# Patient Record
Sex: FEMALE | Race: WHITE | NOT HISPANIC OR LATINO | Employment: UNEMPLOYED | URBAN - METROPOLITAN AREA
[De-identification: names, ages, dates, MRNs, and addresses within clinical notes are randomized per-mention and may not be internally consistent; named-entity substitution may affect disease eponyms.]

---

## 2024-02-17 ENCOUNTER — HOSPITAL ENCOUNTER (EMERGENCY)
Facility: CLINIC | Age: 59
Discharge: HOME | End: 2024-02-17
Attending: EMERGENCY MEDICINE | Admitting: EMERGENCY MEDICINE

## 2024-02-17 ENCOUNTER — APPOINTMENT (OUTPATIENT)
Dept: GENERAL RADIOLOGY | Facility: CLINIC | Age: 59
End: 2024-02-17
Attending: EMERGENCY MEDICINE

## 2024-02-17 ENCOUNTER — APPOINTMENT (OUTPATIENT)
Dept: GENERAL RADIOLOGY | Facility: CLINIC | Age: 59
End: 2024-02-17
Attending: SOCIAL WORKER

## 2024-02-17 VITALS
RESPIRATION RATE: 16 BRPM | TEMPERATURE: 98.4 F | WEIGHT: 150 LBS | HEIGHT: 65 IN | HEART RATE: 78 BPM | BODY MASS INDEX: 24.99 KG/M2 | DIASTOLIC BLOOD PRESSURE: 104 MMHG | OXYGEN SATURATION: 95 % | SYSTOLIC BLOOD PRESSURE: 143 MMHG
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
## 2024-02-17 DIAGNOSIS — S52.602A CLOSED FRACTURE OF DISTAL ENDS OF LEFT RADIUS AND ULNA, INITIAL ENCOUNTER: ICD-10-CM

## 2024-02-17 DIAGNOSIS — S52.502A CLOSED FRACTURE OF DISTAL ENDS OF LEFT RADIUS AND ULNA, INITIAL ENCOUNTER: ICD-10-CM

## 2024-02-17 LAB
ANION GAP SERPL CALCULATED.3IONS-SCNC: 9 MMOL/L (ref 7–15)
ATRIAL RATE - MUSE: 73 BPM
BUN SERPL-MCNC: 10.6 MG/DL (ref 6–20)
CALCIUM SERPL-MCNC: 9.1 MG/DL (ref 8.6–10)
CHLORIDE SERPL-SCNC: 102 MMOL/L (ref 98–107)
CREAT SERPL-MCNC: 0.56 MG/DL (ref 0.51–0.95)
DEPRECATED HCO3 PLAS-SCNC: 26 MMOL/L (ref 22–29)
DIASTOLIC BLOOD PRESSURE - MUSE: NORMAL MMHG
EGFRCR SERPLBLD CKD-EPI 2021: >90 ML/MIN/1.73M2
ERYTHROCYTE [DISTWIDTH] IN BLOOD BY AUTOMATED COUNT: 13 % (ref 10–15)
GLUCOSE SERPL-MCNC: 102 MG/DL (ref 70–99)
HCT VFR BLD AUTO: 38.3 % (ref 35–47)
HGB BLD-MCNC: 12.6 G/DL (ref 11.7–15.7)
INTERPRETATION ECG - MUSE: NORMAL
MCH RBC QN AUTO: 29 PG (ref 26.5–33)
MCHC RBC AUTO-ENTMCNC: 32.9 G/DL (ref 31.5–36.5)
MCV RBC AUTO: 88 FL (ref 78–100)
P AXIS - MUSE: 62 DEGREES
PLATELET # BLD AUTO: 331 10E3/UL (ref 150–450)
POTASSIUM SERPL-SCNC: 3.6 MMOL/L (ref 3.4–5.3)
PR INTERVAL - MUSE: 162 MS
QRS DURATION - MUSE: 84 MS
QT - MUSE: 396 MS
QTC - MUSE: 436 MS
R AXIS - MUSE: -4 DEGREES
RBC # BLD AUTO: 4.34 10E6/UL (ref 3.8–5.2)
SODIUM SERPL-SCNC: 137 MMOL/L (ref 135–145)
SYSTOLIC BLOOD PRESSURE - MUSE: NORMAL MMHG
T AXIS - MUSE: 67 DEGREES
VENTRICULAR RATE- MUSE: 73 BPM
WBC # BLD AUTO: 15.6 10E3/UL (ref 4–11)

## 2024-02-17 PROCEDURE — 99285 EMERGENCY DEPT VISIT HI MDM: CPT | Mod: 25

## 2024-02-17 PROCEDURE — 96372 THER/PROPH/DIAG INJ SC/IM: CPT | Performed by: EMERGENCY MEDICINE

## 2024-02-17 PROCEDURE — 80048 BASIC METABOLIC PNL TOTAL CA: CPT | Performed by: STUDENT IN AN ORGANIZED HEALTH CARE EDUCATION/TRAINING PROGRAM

## 2024-02-17 PROCEDURE — 73110 X-RAY EXAM OF WRIST: CPT | Mod: LT

## 2024-02-17 PROCEDURE — 99222 1ST HOSP IP/OBS MODERATE 55: CPT | Performed by: STUDENT IN AN ORGANIZED HEALTH CARE EDUCATION/TRAINING PROGRAM

## 2024-02-17 PROCEDURE — 25605 CLTX DST RDL FX/EPHYS SEP W/: CPT | Mod: LT

## 2024-02-17 PROCEDURE — 250N000011 HC RX IP 250 OP 636: Performed by: EMERGENCY MEDICINE

## 2024-02-17 PROCEDURE — 85014 HEMATOCRIT: CPT | Performed by: STUDENT IN AN ORGANIZED HEALTH CARE EDUCATION/TRAINING PROGRAM

## 2024-02-17 PROCEDURE — 999N000065 XR WRIST LEFT 2 VIEWS: Mod: LT

## 2024-02-17 PROCEDURE — 36415 COLL VENOUS BLD VENIPUNCTURE: CPT | Performed by: STUDENT IN AN ORGANIZED HEALTH CARE EDUCATION/TRAINING PROGRAM

## 2024-02-17 RX ORDER — MORPHINE SULFATE 15 MG/1
15-30 TABLET ORAL EVERY 6 HOURS PRN
Qty: 12 TABLET | Refills: 0 | Status: ON HOLD | OUTPATIENT
Start: 2024-02-17 | End: 2024-02-19

## 2024-02-17 RX ORDER — HYDROMORPHONE HYDROCHLORIDE 1 MG/ML
0.5 INJECTION, SOLUTION INTRAMUSCULAR; INTRAVENOUS; SUBCUTANEOUS ONCE
Status: COMPLETED | OUTPATIENT
Start: 2024-02-17 | End: 2024-02-17

## 2024-02-17 RX ADMIN — HYDROMORPHONE HYDROCHLORIDE 0.5 MG: 1 INJECTION, SOLUTION INTRAMUSCULAR; INTRAVENOUS; SUBCUTANEOUS at 12:47

## 2024-02-17 ASSESSMENT — ACTIVITIES OF DAILY LIVING (ADL)
ADLS_ACUITY_SCORE: 35
ADLS_ACUITY_SCORE: 35

## 2024-02-17 NOTE — ED TRIAGE NOTES
Left wrist pain after slip and fall on ice on the driveway, daughter denies blood thinners, daughter denies hitting head.      Triage Assessment (Adult)       Row Name 02/17/24 1107          Triage Assessment    Airway WDL WDL

## 2024-02-17 NOTE — CONSULTS
[facility]  Consult Note - Hospitalist Service  Date of Admission:  2/17/2024  Consult Requested by: Dr. Romano  Reason for Consult: Pre-operative clearance     Assessment & Plan   Kaitlyn Mahmood is a 58 year old female presented to the ED on 2/17/2024 after slipping and falling in the driveway. She sustained L distal radius fracture and ulnar styloid process fracture.     Distal radius fracture  Ulnar styloid process fracture   Pt slipped and fell in the driveway. She sustained L distal radius fracture and ulnar styloid process fracture. This was splinted in the ED. Orthopedics was contacted and recommend surgical fixation. The patient will be able to discharge home with plans for operative intervention next week. [place] and does not have a PCP here. The hospitalist service was contacted to provide pre-operative clearance.   * Pt denies any significant past medical history, no cardiac hx of known diabetes. She does not take any prescribed medications. No prior surgical history. No hx of easy bleeding or bruising.  * CBC and BMP obtained in the ED. CBC is notable for a mild leukocytosis to 15.6. There is however no signs or symptoms of infectious process. I suspect this is a stress response.   * EKG showed normal sinus rhythm   - I have examined this patient, checked all appropriate lab work and tests. The patient appears medically optimized to undergo the proposed surgical procedure with general and or regional anesthesia. No additional medical work up is required at this time.     Elevated Blood pressures  The patient's blood pressure is elevated to 140-160s systolic. She does not report being on any anti-hypertensive medications. Elevated BPs may be 2/2 pain in the setting of acute wrist fracture.  - I would not recommend starting any anti-hypertensive at this time.   - I would recommend outpatient follow-up and consideration of  starting an anti-hypertensive if blood pressures remain elevated in the outpatient setting.        Clinically Significant Risk Factors Present on Admission                                  Any Santos MD  Hospitalist Service  Securely message with [employer] (more info)  Text page via [facility] Paging/Directory   ______________________________________________________________________    Chief Complaint   Slip and fall     History is obtained from the patient. Her daughter assists with interpretation     History of Present Illness   Kaitlyn Mahmood is a 58 year old female who presented to the ED after slipping and falling in the driveway.    Pt denies any medical history. She has no prior cardiac disease. She has at times had some elevated blood pressures, but she is not on any medications for this. She does not have diabetes. No chest pain, shortness of breath or lower extremity edema. No hx of easy bruising or bleeding. She has no surgical history. No prior hx of anesthesia. No notable family history. She denies tobacco or alcohol use.        Past Medical History    No past medical history on file.    Past Surgical History   No past surgical history on file.    Medications   I have reviewed this patient's current medications  - NONE       Review of Systems    The 10 point Review of Systems is negative other than noted in the HPI or here.     Physical Exam   Vital Signs: Temp: 98.4  F (36.9  C) Temp src: Temporal BP: (!) 143/104 Pulse: 78   Resp: 16 SpO2: 95 % O2 Device: None (Room air)    Weight: 150 lbs 0 oz    Constitutional: Awake, alert, cooperative, no apparent distress.  Eyes: Conjunctiva and pupils examined and normal.  HEENT: Moist mucous membranes, normal dentition.  Respiratory: Clear to auscultation bilaterally, no crackles or wheezing.  Cardiovascular: Regular rate and rhythm, normal S1 and S2, and no murmur noted.  GI: Soft, non-distended, non-tender, normal bowel sounds.  Skin: No rashes, no  cyanosis, no edema.  Musculoskeletal: Left arm splinted and in sling   Neurologic: Cranial nerves 2-12 intact, normal strength and sensation.  Psychiatric: Alert, oriented to person, place and time, no obvious anxiety or depression.      Medical Decision Making       65 MINUTES SPENT BY ME on the date of service doing chart review, history, exam, documentation & further activities per the note.      Data     I have personally reviewed the following data over the past 24 hrs:    15.6 (H)  \   12.6   / 331     N/A N/A N/A /  N/A   N/A N/A N/A \       Imaging results reviewed over the past 24 hrs:   Recent Results (from the past 24 hour(s))   XR Wrist Left G/E 3 Views    Narrative    EXAM: XR WRIST LEFT G/E 3 VIEWS  LOCATION: [facility]  DATE: 2/17/2024    INDICATION: Pain.  COMPARISON: None.      Impression    IMPRESSION:    There is an acute comminuted mildly displaced and angulated fracture of the distal radius. Acute displaced ulnar styloid process fracture. Positive ulnar variance, likely posttraumatic.      NOTE: ABNORMAL REPORT    THE DICTATION ABOVE DESCRIBES AN ABNORMALITY FOR WHICH FOLLOW-UP IS NEEDED.     XR Wrist Left 2 Views    Narrative    EXAM: XR WRIST LEFT 2 VIEWS  LOCATION: [facility]  DATE: 02/17/2024    INDICATION: Post reduction.  COMPARISON: Same day radiograph.      Impression    IMPRESSION: Interval splinting of the impacted and displaced distal radius and ulnar fractures. Partial improvement in alignment. Fine bony detail is obscured. Bone demineralization.

## 2024-02-17 NOTE — CONSULTS
[facility]    Orthopedics Consultation    Date of Admission:  2/17/2024    Assessment & Plan     PLAN:     Kaitlyn Mahmood  is a 58 year old female with a displaced distal radius fracture on the left after a fall.  She is neurovascular intact on exam.  She is status post a reduction in the emergency department.  Radiographs reveal comminution displaced distal radius fracture.  I discussed these findings with the patient as well as her daughter.  The patient is R[place]an-speaking and uses her daughter for interpretation.  We discussed potential treatment options including intervention along with risk and benefits expected recovery.  I recommended surgery improve the alignment of the bone and allow for earlier function and motion.  After a thorough discussion in agreement elected proceed to surgery.    Plan for a left distal radius open reduction internal fixation.  Patient may be discharged and will follow-up for surgery, likely this upcoming Monday  Recommend medical evaluation while in the hospital for preoperative clearance and to expedite the patient's ability to proceed with surgery.  Splint and sling    Active Problems:    * No active hospital problems. *    ISMAEL HOGUE MD     Code Status    No Order    Reason for Consult   Reason for consult: Wrist fracture    Primary Care Physician   Physician No Ref-Primary    Chief Complaint   Wrist fracture    History is obtained from the patient and daughter    History of Present Illness   Kaitlyn Mahmood is a 58 year old female who presents with a left distal radius fracture after a fall.  Patient reports she slipped and fell on ice in the driveway.  She had obvious pain and deformity.  She presented to [facility] emergency department where radiographs were obtained and revealed a displaced distal radius fracture.  She is status post closed reduction and splinting in the emergency department.  She denies any numbness  or paresthesias.  No prior injuries.  She is Palauan speaking and her daughter was available for interpretation.    Past Medical History   I have reviewed this patient's medical history and updated it with pertinent information if needed.   No past medical history on file.    Past Surgical History   I have reviewed this patient's surgical history and updated it with pertinent information if needed.  No past surgical history on file.    Prior to Admission Medications   None     Allergies   No Known Allergies    Social History   I have reviewed this patient's social history and updated it with pertinent information if needed. Kaitlyn Mahmood      Family History   I have reviewed this patient's family history and updated it with pertinent information if needed.   No family history on file.    Review of Systems   The 10 point Review of Systems is negative other than noted in the HPI or here.     Physical Exam   Temp: 98.4  F (36.9  C) Temp src: Temporal BP: (!) 143/104 Pulse: 78   Resp: 16 SpO2: 95 % O2 Device: None (Room air)    Vital Signs with Ranges  Temp:  [98.4  F (36.9  C)] 98.4  F (36.9  C)  Pulse:  [78-82] 78  Resp:  [16] 16  BP: (143-168)/(101-104) 143/104  SpO2:  [95 %-96 %] 95 %  150 lbs 0 oz    Constitutional: awake, alert, cooperative, no apparent distress, and appears stated age  Eyes: extra-ocular muscles intact, no scleral icterus  ENT: normocepalic, atraumatic without obvious abnormality  Respiratory: no increased work of breathing, symmetric chest wall expansion    MSK:  Left Upper Extremity  Splint and sling intact  Unable to assess motion due to pain  Tenderness to palpation over distal radius  No tenderness to palpation over the elbow  Sensations intact to light touch in the median, ulnar, radial nerve distributions  FPL, EPL, Intrinsic hand muscles are intact  2+ radial pulse    Right Upper Extremity  Skin is intact. No swelling or ecchymosis  No tenderness to palpation over the shoulder,  arm, elbow, forearm, wrist or hand.  No pain with ROM of the shoulder, elbow, wrist, hand  Sensations intact to light touch in the median, ulnar, radial nerve distributions  FPL, EPL, Intrinsic hand muscles are intact  2+ radial pulse      Data   Most Recent 3 CBC's:  Recent Labs   Lab Test 02/17/24  1516   WBC 15.6*   HGB 12.6   MCV 88        Most Recent 3 BMP's:No lab results found.  Most Recent 3 INR's:No lab results found.

## 2024-02-17 NOTE — DISCHARGE INSTRUCTIONS
Ice area pain 20 minutes, 4 times per day for the next several days  Keep splint dry as discussed  Tylenol and/or ibuprofen as needed for pain control  For breakthrough pain can take immediate release morphine tablets as prescribed; recommend using over-the-counter stool softener as this medication may cause constipation    Do not eat after midnight on Sunday night in anticipation of potential surgery on Monday

## 2024-02-17 NOTE — ED PROVIDER NOTES
"  History     Chief Complaint:  Wrist Pain     A  was used (Lebanese).      Kaitlyn Mahmood is a 58 year old female who presents to the ED with daughter for left wrist pain. Per daughter, patient fell (slipped on ice) and caught herself with left wrist about an hour ago.  Patient had immediate pain and deformity to the wrist after fall.  No other injuries reported.  Patient is still able to move her fingers; denies numbness.  Patient is visiting from [facility]; daughter is serving as .  Patient is in the [place] on travel visa.    Independent Historian:   Daughter - They report supplementary information.     Review of External Notes:   N/a     Medications:    No current outpatient medications reported.     Past Medical History:    No past medical history reported.     Past Surgical History:    No past surgical history reported.      Physical Exam   Patient Vitals for the past 24 hrs:   BP Temp Temp src Pulse Resp SpO2 Height Weight   02/17/24 1324 (!) 143/104 -- -- 78 -- 95 % -- --   02/17/24 1110 (!) 168/101 98.4  F (36.9  C) Temporal 82 16 96 % 1.651 m (5' 5\") 68 kg (150 lb)        Physical Exam  General: Patient in mild distress.  Alert and cooperative with exam. Normal mentation  HEENT: NC/AT. Conjunctiva without injection or scleral icterus. External ears normal.  Respiratory: Breathing comfortably on room air  CV: Normal rate, all extremities well perfused  GI:  Non-distended abdomen  Skin: Warm, dry, no rashes/open wounds on exposed skin  Musculoskeletal: Left upper extremity: CMS intact.  Obvious deformity, tenderness, and swelling at the wrist.  Elbow and hand exam normal.  No evidence of open fracture.  Compartments soft and compressible.  Neuro: Alert, answers questions appropriately. No gross motor deficits      Emergency Department Course     ECG results from 02/17/24   EKG 12-lead, tracing only     Value    Systolic Blood Pressure     Diastolic Blood " Pressure     Ventricular Rate 73    Atrial Rate 73    IL Interval 162    QRS Duration 84        QTc 436    P Axis 62    R AXIS -4    T Axis 67    Interpretation ECG      Sinus rhythm  Possible Anterior infarct , age undetermined  Abnormal ECG  No previous ECGs available  Confirmed by GENERATED REPORT, COMPUTER ([id number]),  Sushila Rivas ([id number]) on 2/17/2024 3:35:46 PM         Imaging:  XR Wrist Left 2 Views   Final Result   IMPRESSION: Interval splinting of the impacted and displaced distal radius and ulnar fractures. Partial improvement in alignment. Fine bony detail is obscured. Bone demineralization.         XR Wrist Left G/E 3 Views   Final Result   IMPRESSION:      There is an acute comminuted mildly displaced and angulated fracture of the distal radius. Acute displaced ulnar styloid process fracture. Positive ulnar variance, likely posttraumatic.         NOTE: ABNORMAL REPORT      THE DICTATION ABOVE DESCRIBES AN ABNORMALITY FOR WHICH FOLLOW-UP IS NEEDED.               Laboratory:  Labs Ordered and Resulted from Time of ED Arrival to Time of ED Departure   CBC WITH PLATELETS - Abnormal       Result Value    WBC Count 15.6 (*)     RBC Count 4.34      Hemoglobin 12.6      Hematocrit 38.3      MCV 88      MCH 29.0      MCHC 32.9      RDW 13.0      Platelet Count 331     BASIC METABOLIC PANEL - Abnormal    Sodium 137      Potassium 3.6      Chloride 102      Carbon Dioxide (CO2) 26      Anion Gap 9      Urea Nitrogen 10.6      Creatinine 0.56      GFR Estimate >90      Calcium 9.1      Glucose 102 (*)         Procedures     Splint Placement     Procedure: Splint Placement     Indication: Fracture    Consent: Verbal     Location: Left Wrist    Procedure detail:   Splint was applied by Myself  Splint type: Sugar-tong   Splint materilal: Plaster  After placement I checked and adjusted the fit as needed to ensure proper positioning/fit   Sensation and circulation are intact after splint placement     Patient  Status: The patient tolerated the procedure well: Yes. There were no complications.  Patient notes improvement in pain with splint in place    Fracture Reduction     Procedure: Fracture Reduction     Indication: Left distal radius/ulna fracture    Location: Left wrist    Consent: Verbal from Patient     Anesthesia/Sedation: Lidocaine - 1% -hematoma block    Procedure Detail: I manually manipulated and reduced the fracture.   Immobilized with: Custom splint (See separate procedure note)  Post-reduction x-ray showed improved alignment  Post-procedure distal neurovascular function was intact.     Patient Status:  The patient tolerated the procedure well: Yes. There were no complications.   Emergency Department Course & Assessments:       Interventions:  Medications   HYDROmorphone (PF) (DILAUDID) injection 0.5 mg (0.5 mg Intramuscular $Given 2/17/24 1247)        Assessments:  1206 The medical student obtained history and examined the patient as noted above.   1230 I obtained history and examined the patient as noted above.     Independent Interpretation (X-rays, CTs, rhythm strip):  Left wrist x-ray: Comminuted distal radius fracture with ulnar styloid fracture  Left wrist x-ray postreduction: Partial improvement in alignment    Consultations/Discussion of Management or Tests:  Patient's care was discussed with Dr. Santos of the hospitalist service.  Patient's care was discussed with Dr. Bravo of orthopedics       Social Determinants of Health affecting care:   Healthcare Access/Compliance    Disposition:  The patient was discharged.     Impression & Plan      Medical Decision Making:  Patient is a 58-year-old female who presents with left wrist pain after slipping and falling in the ice.  Patient's medical history and records reviewed.  Initial consideration for, not limited to, fracture, dislocation, soft tissue injury, among others.  X-ray demonstrates distal radius and ulnar styloid fracture as noted above.   Patient consented to and underwent attempt at fracture reduction and splint placement per procedure note above.  Fracture was able to be partially reduced.  Discussed with orthopedics who will plan for outpatient surgical intervention.  Hospitalist service was contacted for preoperative clearance; preoperative labs and EKG without significant acute findings.  Patient's pain was well-controlled in the ED.  Discussed supportive care and provided prescription for MS IR for breakthrough pain.  [facility] orthopedics is planning for surgery on Monday (in 2 days) ; patient understands that she is to be n.p.o. Sunday night.  Return precautions discussed.  Discharged home with family.    Diagnosis:    ICD-10-CM    1. Closed fracture of distal ends of left radius and ulna, initial encounter  S52.502A     S52.602A       2. Fall due to slipping on ice or snow, initial encounter  W00.9XXA            Discharge Medications:  Discharge Medication List as of 2/17/2024  4:29 PM        START taking these medications    Details   morphine (MSIR) 15 MG IR tablet Take 1-2 tablets (15-30 mg) by mouth every 6 hours as needed for severe pain, Disp-12 tablet, R-0, Local Print              Scribe Disclosure:  ILoreto, am serving as a scribe at 12:37 PM on 2/17/2024 to document services personally performed by Fransisco Romano DO  based on my observations and the provider's statements to me.   2/17/2024   Fransisco Romano Christopher Warren, DO  02/17/24 1917

## 2024-02-18 ENCOUNTER — PREP FOR PROCEDURE (OUTPATIENT)
Dept: OTHER | Facility: CLINIC | Age: 59
End: 2024-02-18

## 2024-02-18 DIAGNOSIS — S52.502A CLOSED FRACTURE OF DISTAL END OF LEFT RADIUS, UNSPECIFIED FRACTURE MORPHOLOGY, INITIAL ENCOUNTER: Primary | ICD-10-CM

## 2024-02-19 ENCOUNTER — HOSPITAL ENCOUNTER (OUTPATIENT)
Facility: CLINIC | Age: 59
Discharge: HOME | End: 2024-02-19
Attending: STUDENT IN AN ORGANIZED HEALTH CARE EDUCATION/TRAINING PROGRAM | Admitting: STUDENT IN AN ORGANIZED HEALTH CARE EDUCATION/TRAINING PROGRAM

## 2024-02-19 ENCOUNTER — ANESTHESIA (OUTPATIENT)
Dept: SURGERY | Facility: CLINIC | Age: 59
End: 2024-02-19

## 2024-02-19 ENCOUNTER — ANESTHESIA EVENT (OUTPATIENT)
Dept: SURGERY | Facility: CLINIC | Age: 59
End: 2024-02-19

## 2024-02-19 ENCOUNTER — APPOINTMENT (OUTPATIENT)
Dept: GENERAL RADIOLOGY | Facility: CLINIC | Age: 59
End: 2024-02-19
Attending: STUDENT IN AN ORGANIZED HEALTH CARE EDUCATION/TRAINING PROGRAM

## 2024-02-19 VITALS
HEART RATE: 64 BPM | DIASTOLIC BLOOD PRESSURE: 71 MMHG | TEMPERATURE: 97.5 F | SYSTOLIC BLOOD PRESSURE: 118 MMHG | BODY MASS INDEX: 27.32 KG/M2 | WEIGHT: 164.2 LBS | OXYGEN SATURATION: 95 % | RESPIRATION RATE: 12 BRPM

## 2024-02-19 DIAGNOSIS — S52.502A CLOSED FRACTURE OF DISTAL END OF LEFT RADIUS, UNSPECIFIED FRACTURE MORPHOLOGY, INITIAL ENCOUNTER: Primary | ICD-10-CM

## 2024-02-19 PROCEDURE — 250N000025 HC SEVOFLURANE, PER MIN: Performed by: STUDENT IN AN ORGANIZED HEALTH CARE EDUCATION/TRAINING PROGRAM

## 2024-02-19 PROCEDURE — C1713 ANCHOR/SCREW BN/BN,TIS/BN: HCPCS | Performed by: STUDENT IN AN ORGANIZED HEALTH CARE EDUCATION/TRAINING PROGRAM

## 2024-02-19 PROCEDURE — 250N000009 HC RX 250

## 2024-02-19 PROCEDURE — 370N000017 HC ANESTHESIA TECHNICAL FEE, PER MIN: Performed by: STUDENT IN AN ORGANIZED HEALTH CARE EDUCATION/TRAINING PROGRAM

## 2024-02-19 PROCEDURE — 710N000009 HC RECOVERY PHASE 1, LEVEL 1, PER MIN: Performed by: STUDENT IN AN ORGANIZED HEALTH CARE EDUCATION/TRAINING PROGRAM

## 2024-02-19 PROCEDURE — 258N000003 HC RX IP 258 OP 636: Performed by: NURSE ANESTHETIST, CERTIFIED REGISTERED

## 2024-02-19 PROCEDURE — 250N000013 HC RX MED GY IP 250 OP 250 PS 637: Performed by: ANESTHESIOLOGY

## 2024-02-19 PROCEDURE — 250N000011 HC RX IP 250 OP 636: Performed by: NURSE ANESTHETIST, CERTIFIED REGISTERED

## 2024-02-19 PROCEDURE — 999N000179 XR SURGERY CARM FLUORO LESS THAN 5 MIN W STILLS: Mod: TC

## 2024-02-19 PROCEDURE — C1769 GUIDE WIRE: HCPCS | Performed by: STUDENT IN AN ORGANIZED HEALTH CARE EDUCATION/TRAINING PROGRAM

## 2024-02-19 PROCEDURE — 272N000002 HC OR SUPPLY OTHER OPNP: Performed by: STUDENT IN AN ORGANIZED HEALTH CARE EDUCATION/TRAINING PROGRAM

## 2024-02-19 PROCEDURE — 710N000012 HC RECOVERY PHASE 2, PER MINUTE: Performed by: STUDENT IN AN ORGANIZED HEALTH CARE EDUCATION/TRAINING PROGRAM

## 2024-02-19 PROCEDURE — 999N000141 HC STATISTIC PRE-PROCEDURE NURSING ASSESSMENT: Performed by: STUDENT IN AN ORGANIZED HEALTH CARE EDUCATION/TRAINING PROGRAM

## 2024-02-19 PROCEDURE — 272N000001 HC OR GENERAL SUPPLY STERILE: Performed by: STUDENT IN AN ORGANIZED HEALTH CARE EDUCATION/TRAINING PROGRAM

## 2024-02-19 PROCEDURE — 250N000009 HC RX 250: Performed by: STUDENT IN AN ORGANIZED HEALTH CARE EDUCATION/TRAINING PROGRAM

## 2024-02-19 PROCEDURE — 250N000013 HC RX MED GY IP 250 OP 250 PS 637

## 2024-02-19 PROCEDURE — 250N000009 HC RX 250: Performed by: NURSE ANESTHETIST, CERTIFIED REGISTERED

## 2024-02-19 PROCEDURE — 258N000003 HC RX IP 258 OP 636: Performed by: ANESTHESIOLOGY

## 2024-02-19 PROCEDURE — 250N000011 HC RX IP 250 OP 636

## 2024-02-19 PROCEDURE — 250N000011 HC RX IP 250 OP 636: Performed by: STUDENT IN AN ORGANIZED HEALTH CARE EDUCATION/TRAINING PROGRAM

## 2024-02-19 PROCEDURE — 360N000084 HC SURGERY LEVEL 4 W/ FLUORO, PER MIN: Performed by: STUDENT IN AN ORGANIZED HEALTH CARE EDUCATION/TRAINING PROGRAM

## 2024-02-19 DEVICE — IMPLANTABLE DEVICE: Type: IMPLANTABLE DEVICE | Site: WRIST | Status: FUNCTIONAL

## 2024-02-19 DEVICE — IMP SCR BN ACUMED NON-LOCK HEXALOBE 3.5X10MM 30-0256: Type: IMPLANTABLE DEVICE | Site: WRIST | Status: FUNCTIONAL

## 2024-02-19 DEVICE — SCREW NON-LOCKING HEXALOBE 3.5MM X 12MM: Type: IMPLANTABLE DEVICE | Site: WRIST | Status: FUNCTIONAL

## 2024-02-19 DEVICE — IMP SCR BONE ACUMED CORT THRD LOCK 2.3X20MM TI CO-T2320: Type: IMPLANTABLE DEVICE | Site: WRIST | Status: FUNCTIONAL

## 2024-02-19 DEVICE — SCREW NON-LOCKING HEXALOBE 3.5MM X 14MM: Type: IMPLANTABLE DEVICE | Site: WRIST | Status: FUNCTIONAL

## 2024-02-19 RX ORDER — ONDANSETRON 4 MG/1
4 TABLET, ORALLY DISINTEGRATING ORAL
Status: DISCONTINUED | OUTPATIENT
Start: 2024-02-19 | End: 2024-02-19 | Stop reason: HOSPADM

## 2024-02-19 RX ORDER — OXYCODONE HYDROCHLORIDE 5 MG/1
5-10 TABLET ORAL EVERY 4 HOURS PRN
Qty: 20 TABLET | Refills: 0 | Status: SHIPPED | OUTPATIENT
Start: 2024-02-19

## 2024-02-19 RX ORDER — BUPIVACAINE HYDROCHLORIDE 2.5 MG/ML
INJECTION, SOLUTION EPIDURAL; INFILTRATION; INTRACAUDAL PRN
Status: DISCONTINUED | OUTPATIENT
Start: 2024-02-19 | End: 2024-02-19 | Stop reason: HOSPADM

## 2024-02-19 RX ORDER — LIDOCAINE 40 MG/G
CREAM TOPICAL
Status: DISCONTINUED | OUTPATIENT
Start: 2024-02-19 | End: 2024-02-19 | Stop reason: HOSPADM

## 2024-02-19 RX ORDER — PROPOFOL 10 MG/ML
INJECTION, EMULSION INTRAVENOUS PRN
Status: DISCONTINUED | OUTPATIENT
Start: 2024-02-19 | End: 2024-02-19

## 2024-02-19 RX ORDER — ASPIRIN 81 MG/1
81 TABLET ORAL DAILY
Qty: 30 TABLET | Refills: 0 | Status: SHIPPED | OUTPATIENT
Start: 2024-02-19

## 2024-02-19 RX ORDER — DEXAMETHASONE SODIUM PHOSPHATE 4 MG/ML
INJECTION, SOLUTION INTRA-ARTICULAR; INTRALESIONAL; INTRAMUSCULAR; INTRAVENOUS; SOFT TISSUE PRN
Status: DISCONTINUED | OUTPATIENT
Start: 2024-02-19 | End: 2024-02-19

## 2024-02-19 RX ORDER — MAGNESIUM SULFATE HEPTAHYDRATE 40 MG/ML
2 INJECTION, SOLUTION INTRAVENOUS
Status: DISCONTINUED | OUTPATIENT
Start: 2024-02-19 | End: 2024-02-19 | Stop reason: HOSPADM

## 2024-02-19 RX ORDER — METHADONE HYDROCHLORIDE 10 MG/ML
2 INJECTION, SOLUTION INTRAMUSCULAR; INTRAVENOUS; SUBCUTANEOUS 3 TIMES DAILY PRN
Status: DISCONTINUED | OUTPATIENT
Start: 2024-02-19 | End: 2024-02-19 | Stop reason: HOSPADM

## 2024-02-19 RX ORDER — ONDANSETRON 4 MG/1
4 TABLET, ORALLY DISINTEGRATING ORAL EVERY 30 MIN PRN
Status: DISCONTINUED | OUTPATIENT
Start: 2024-02-19 | End: 2024-02-19 | Stop reason: HOSPADM

## 2024-02-19 RX ORDER — LIDOCAINE HYDROCHLORIDE 20 MG/ML
INJECTION, SOLUTION INFILTRATION; PERINEURAL PRN
Status: DISCONTINUED | OUTPATIENT
Start: 2024-02-19 | End: 2024-02-19

## 2024-02-19 RX ORDER — LABETALOL HYDROCHLORIDE 5 MG/ML
10 INJECTION, SOLUTION INTRAVENOUS
Status: DISCONTINUED | OUTPATIENT
Start: 2024-02-19 | End: 2024-02-19 | Stop reason: HOSPADM

## 2024-02-19 RX ORDER — ALBUTEROL SULFATE 0.83 MG/ML
2.5 SOLUTION RESPIRATORY (INHALATION) EVERY 4 HOURS PRN
Status: DISCONTINUED | OUTPATIENT
Start: 2024-02-19 | End: 2024-02-19 | Stop reason: HOSPADM

## 2024-02-19 RX ORDER — HYDRALAZINE HYDROCHLORIDE 20 MG/ML
10 INJECTION INTRAMUSCULAR; INTRAVENOUS EVERY 10 MIN PRN
Status: DISCONTINUED | OUTPATIENT
Start: 2024-02-19 | End: 2024-02-19 | Stop reason: HOSPADM

## 2024-02-19 RX ORDER — ACETAMINOPHEN 325 MG/1
975 TABLET ORAL ONCE
Status: COMPLETED | OUTPATIENT
Start: 2024-02-19 | End: 2024-02-19

## 2024-02-19 RX ORDER — METHOCARBAMOL 750 MG/1
750 TABLET, FILM COATED ORAL
Status: COMPLETED | OUTPATIENT
Start: 2024-02-19 | End: 2024-02-19

## 2024-02-19 RX ORDER — ONDANSETRON 2 MG/ML
INJECTION INTRAMUSCULAR; INTRAVENOUS PRN
Status: DISCONTINUED | OUTPATIENT
Start: 2024-02-19 | End: 2024-02-19

## 2024-02-19 RX ORDER — SODIUM CHLORIDE, SODIUM LACTATE, POTASSIUM CHLORIDE, CALCIUM CHLORIDE 600; 310; 30; 20 MG/100ML; MG/100ML; MG/100ML; MG/100ML
INJECTION, SOLUTION INTRAVENOUS CONTINUOUS
Status: DISCONTINUED | OUTPATIENT
Start: 2024-02-19 | End: 2024-02-19 | Stop reason: HOSPADM

## 2024-02-19 RX ORDER — METHADONE HYDROCHLORIDE 10 MG/ML
INJECTION, SOLUTION INTRAMUSCULAR; INTRAVENOUS; SUBCUTANEOUS PRN
Status: DISCONTINUED | OUTPATIENT
Start: 2024-02-19 | End: 2024-02-19

## 2024-02-19 RX ORDER — KETOROLAC TROMETHAMINE 30 MG/ML
INJECTION, SOLUTION INTRAMUSCULAR; INTRAVENOUS PRN
Status: DISCONTINUED | OUTPATIENT
Start: 2024-02-19 | End: 2024-02-19

## 2024-02-19 RX ORDER — MAGNESIUM HYDROXIDE 1200 MG/15ML
LIQUID ORAL PRN
Status: DISCONTINUED | OUTPATIENT
Start: 2024-02-19 | End: 2024-02-19 | Stop reason: HOSPADM

## 2024-02-19 RX ORDER — ACETAMINOPHEN 325 MG/1
650 TABLET ORAL EVERY 4 HOURS PRN
Qty: 50 TABLET | Refills: 0 | Status: SHIPPED | OUTPATIENT
Start: 2024-02-19

## 2024-02-19 RX ORDER — CEFAZOLIN SODIUM/WATER 2 G/20 ML
2 SYRINGE (ML) INTRAVENOUS
Status: COMPLETED | OUTPATIENT
Start: 2024-02-19 | End: 2024-02-19

## 2024-02-19 RX ORDER — TRANEXAMIC ACID 10 MG/ML
1 INJECTION, SOLUTION INTRAVENOUS ONCE
Status: DISCONTINUED | OUTPATIENT
Start: 2024-02-19 | End: 2024-02-19 | Stop reason: HOSPADM

## 2024-02-19 RX ORDER — ONDANSETRON 4 MG/1
4 TABLET, ORALLY DISINTEGRATING ORAL EVERY 8 HOURS PRN
Qty: 4 TABLET | Refills: 0 | Status: SHIPPED | OUTPATIENT
Start: 2024-02-19

## 2024-02-19 RX ORDER — ONDANSETRON 2 MG/ML
4 INJECTION INTRAMUSCULAR; INTRAVENOUS EVERY 30 MIN PRN
Status: DISCONTINUED | OUTPATIENT
Start: 2024-02-19 | End: 2024-02-19 | Stop reason: HOSPADM

## 2024-02-19 RX ORDER — KETOROLAC TROMETHAMINE 15 MG/ML
15 INJECTION, SOLUTION INTRAMUSCULAR; INTRAVENOUS
Status: DISCONTINUED | OUTPATIENT
Start: 2024-02-19 | End: 2024-02-19 | Stop reason: HOSPADM

## 2024-02-19 RX ORDER — IBUPROFEN 600 MG/1
600 TABLET, FILM COATED ORAL EVERY 6 HOURS PRN
Qty: 30 TABLET | Refills: 0 | Status: SHIPPED | OUTPATIENT
Start: 2024-02-19

## 2024-02-19 RX ORDER — CEFAZOLIN SODIUM/WATER 2 G/20 ML
2 SYRINGE (ML) INTRAVENOUS SEE ADMIN INSTRUCTIONS
Status: DISCONTINUED | OUTPATIENT
Start: 2024-02-19 | End: 2024-02-19 | Stop reason: HOSPADM

## 2024-02-19 RX ORDER — OXYCODONE HYDROCHLORIDE 5 MG/1
5 TABLET ORAL
Status: DISCONTINUED | OUTPATIENT
Start: 2024-02-19 | End: 2024-02-19 | Stop reason: HOSPADM

## 2024-02-19 RX ORDER — ACETAMINOPHEN 325 MG/1
650 TABLET ORAL
Status: DISCONTINUED | OUTPATIENT
Start: 2024-02-19 | End: 2024-02-19 | Stop reason: HOSPADM

## 2024-02-19 RX ORDER — MULTIVITAMIN
1 TABLET ORAL DAILY
COMMUNITY

## 2024-02-19 RX ORDER — TRANEXAMIC ACID 10 MG/ML
1 INJECTION, SOLUTION INTRAVENOUS ONCE
Status: COMPLETED | OUTPATIENT
Start: 2024-02-19 | End: 2024-02-19

## 2024-02-19 RX ADMIN — PROPOFOL 150 MG: 10 INJECTION, EMULSION INTRAVENOUS at 07:42

## 2024-02-19 RX ADMIN — LIDOCAINE HYDROCHLORIDE 50 MG: 20 INJECTION, SOLUTION INFILTRATION; PERINEURAL at 07:42

## 2024-02-19 RX ADMIN — METHADONE HYDROCHLORIDE 10 MG: 10 INJECTION, SOLUTION INTRAMUSCULAR; INTRAVENOUS; SUBCUTANEOUS at 07:42

## 2024-02-19 RX ADMIN — MIDAZOLAM 2 MG: 1 INJECTION INTRAMUSCULAR; INTRAVENOUS at 07:36

## 2024-02-19 RX ADMIN — DEXMEDETOMIDINE HYDROCHLORIDE 0.5 MCG/KG/HR: 100 INJECTION, SOLUTION INTRAVENOUS at 07:42

## 2024-02-19 RX ADMIN — KETOROLAC TROMETHAMINE 15 MG: 30 INJECTION, SOLUTION INTRAMUSCULAR at 07:42

## 2024-02-19 RX ADMIN — DEXAMETHASONE SODIUM PHOSPHATE 8 MG: 4 INJECTION, SOLUTION INTRA-ARTICULAR; INTRALESIONAL; INTRAMUSCULAR; INTRAVENOUS; SOFT TISSUE at 07:42

## 2024-02-19 RX ADMIN — METHOCARBAMOL 750 MG: 750 TABLET ORAL at 09:42

## 2024-02-19 RX ADMIN — ONDANSETRON 4 MG: 2 INJECTION INTRAMUSCULAR; INTRAVENOUS at 07:42

## 2024-02-19 RX ADMIN — SODIUM CHLORIDE, POTASSIUM CHLORIDE, SODIUM LACTATE AND CALCIUM CHLORIDE: 600; 310; 30; 20 INJECTION, SOLUTION INTRAVENOUS at 07:18

## 2024-02-19 RX ADMIN — SODIUM CHLORIDE, POTASSIUM CHLORIDE, SODIUM LACTATE AND CALCIUM CHLORIDE: 600; 310; 30; 20 INJECTION, SOLUTION INTRAVENOUS at 07:36

## 2024-02-19 RX ADMIN — Medication 2 G: at 07:36

## 2024-02-19 RX ADMIN — TRANEXAMIC ACID 1 G: 10 INJECTION, SOLUTION INTRAVENOUS at 07:40

## 2024-02-19 RX ADMIN — ACETAMINOPHEN 975 MG: 325 TABLET, FILM COATED ORAL at 06:59

## 2024-02-19 ASSESSMENT — ACTIVITIES OF DAILY LIVING (ADL)
ADLS_ACUITY_SCORE: 36

## 2024-02-19 NOTE — ANESTHESIA CARE TRANSFER NOTE
Patient: Kaitlyn Mahmood    Procedure: Procedure(s):  OPEN REDUCTION INTERNAL FIXATION, FRACTURE, WRIST, LEFT       Diagnosis: Closed fracture of distal end of left radius, unspecified fracture morphology, initial encounter [S52.502A]  Diagnosis Additional Information: No value filed.    Anesthesia Type:   General     Note:    Oropharynx: oropharynx clear of all foreign objects  Level of Consciousness: drowsy  Oxygen Supplementation: face mask  Level of Supplemental Oxygen (L/min / FiO2): 6  Independent Airway: airway patency satisfactory and stable    Vital Signs Stable: post-procedure vital signs reviewed and stable  Report to RN Given: handoff report given  Patient transferred to: PACU    Handoff Report: Identifed the Patient, Identified the Reponsible Provider, Reviewed the pertinent medical history, Discussed the surgical course, Reviewed Intra-OP anesthesia mangement and issues during anesthesia, Set expectations for post-procedure period and Allowed opportunity for questions and acknowledgement of understanding      Vitals:  Vitals Value Taken Time   BP     Temp     Pulse 63 02/19/24 0906   Resp 13 02/19/24 0906   SpO2 100 % 02/19/24 0906   Vitals shown include unfiled device data.    Electronically Signed By: SHAMIR Jacinto CRNA  February 19, 2024  9:07 AM

## 2024-02-19 NOTE — DISCHARGE INSTRUCTIONS
Maximum acetaminophen (Tylenol) dose from all sources should not exceed 4 grams (4000 mg) per day. You last had 975mg at 7am, your next dose is 1pm or later.       You received Toradol, an IV form of Ibuprofen (Motrin) at 7:45am.  Do not take any Ibuprofen products until 1:45pm.     Dr. Fermin Bravo phone number: [phone]

## 2024-02-19 NOTE — OP NOTE
[facility]  Operative Note    Open Reduction Internal Fixation distal radius      Kaitlyn Mahmood MRN# [id number]   YOB: 1965  Procedure Date:  2/19/2024  Age: 58 year old     PREOPERATIVE DIAGNOSIS:     1.  Displaced left distal radius extra-articular fracture  2.  Left ulnar styloid fracture     POSTOPERATIVE DIAGNOSES:     Same     PROCEDURE PERFORMED:    1.  Open reduction and internal fixation distal radius fracture  2.  Right Brachioradialis tenotomy  3.  Application of short arm splint     SURGEON:  Fermin Bravo MD    ASSISTANT: Janine Gorman PA-C;  who was critical for positioning patient, helping obtain reduction, retraction during exposure and implant placement, as well as closure.     ANESTHESIA:  General.      ESTIMATED BLOOD LOSS:   20 mL      FINDINGS: Comminuted distal radius fracture.       INDICATIONS:   Kaitlyn Mahmood  is a 58 year old female with a displaced distal radius fracture on the left after a fall.  She is neurovascular intact on exam.  She is status post a reduction in the emergency department.  Radiographs reveal comminution displaced distal radius fracture.  I discussed these findings with the patient as well as her daughter.  The patient is [place]-speaking and uses her daughter for interpretation.  We discussed potential treatment options including intervention along with risk and benefits expected recovery.  I recommended surgery improve the alignment of the bone and allow for earlier function and motion.      Risks of injury and surgery discussed include but are not limited to bleeding, infection, damage to surrounding neurovascular structures, malunion, delayed union, nonunion, post-traumatic arthritis, wrist stiffness and anesthetic complications.  No guarantees given or implied.  Patient expressed understanding, wishes to proceed, and consent signed.     IMPLANTS:  Acumed 3 hold plate     DESCRIPTION OF PROCEDURE:  The patient was  brought to the operating room table where anesthesia was induced. The patient was positioned with the operative extremity placed on a radiolucent hand table. All bony prominences were well-padded.  The operative extremity was cleansed with Hibclens and then prepped with Chloraprep.  The operative extremity was draped in normal sterile fashion.  A procedural pause was conducted to verify correct patient, correct extremity, presence of the surgeon's initials on the operative extremity. Ancef was previously administered.     Prior to incision the tourniquet was inflated to 250 mm Hg for 39 minutes.     A standard volar approach to distal radius dissecting through the skin, subcutaneous tissue, and identifying the FCR tendon.  The FCR fascia was then assigned and the FCR tendon was retracted. The FPL tendon was identified and retracted ulnarly. The pronator quadratus tendon was then raised along its radial border to expose the fracture. The fracture was then exposed. A  tenotomy of brachioradialis was then performed at its insertion along the radial styloid fragment under direct visualization.      The fracture was then further identified and debrided.  The fracture was noted to be extra-articular and displaced dorsally.  The fracture was then reduced with traction and ulnar deviation.  An Acumed 3 hole narrow plate was found to be a good fit.  The plate was then placed in position and held in place with K wires.  The plate was carefully maneuvered to fit the patient's anatomy and help reduce the patient's fracture.   Multiple fluoroscopic images were then obtained in AP and lateral projections to ensure good placement of the plate along with good reduction of the fracture.      A proximal cortical screw was then placed to reduce the weight to the radius. A kickstand screw was utilized to assist in volar angulation.  We then turned our attention to the distal locking screws a locking tower was placed 6 subsequent  locking screws, fully threaded were placed in the distal aspect of the radius.  This was followed by 2 additional proximal cortical screws.     C-arm showed acceptable articular reduction with restoration of radial height and angulation.  Safe position of the implants.       Copiously irrigated the wound, tourniquet let down, hemostasis obtained.  The pronator quadratus was reapproximated with Vicryl.  The subcutaneous tissue was then closed with 3-0 Monocryl followed by 4-0 nylon along the skin.  The patient was then placed in a short arm splint with a volar and posterior slab.       POSTOPERATIVE PLAN:     1.  No lifting heavier than a coffee cup left upper extremity.   2.  Pain control scheduled ibuprofen and tylenol with first line break though oxycodone as needed.  Minimize narcotics.     3.  Elevate arm.  Keep splint clean and dry.  4.  Follow up with primary care provider over the next 1-2 months for osteoporosis workup and treatment.  5.  Vitamin D 2000 units daily x 2 months  6.  Vitamin C 500 mg daily x 2 months  7.  Discharge home today.    Follow Up:  Please call as soon as possible to make an appointment to be seen in Dr. Fermin Bravo's clinic in 2 weeks.     Dr. Bravo's care coordinator is Kait Gotti. Please contact her at [phone] to schedule an appointment.      Dr. Bravo sees patient's at 2 clinic locations:  [facility]  [address] [place] [address]  [facility]   [address], [address], [place] [address]

## 2024-02-19 NOTE — ANESTHESIA PREPROCEDURE EVALUATION
"Anesthesia Pre-Procedure Evaluation    Patient: Kaitlyn Mahmood   MRN: [id number] : 1965        Procedure : Procedure(s):  OPEN REDUCTION INTERNAL FIXATION, FRACTURE, WRIST, LEFT          No past medical history on file.   No past surgical history on file.   No Known Allergies   Social History     Tobacco Use    Smoking status: Not on file    Smokeless tobacco: Not on file   Substance Use Topics    Alcohol use: Not on file      Wt Readings from Last 1 Encounters:   24 74.5 kg (164 lb 3.2 oz)        Anesthesia Evaluation            ROS/MED HX  ENT/Pulmonary:  - neg pulmonary ROS     Neurologic:  - neg neurologic ROS     Cardiovascular:  - neg cardiovascular ROS     METS/Exercise Tolerance:     Hematologic:  - neg hematologic  ROS     Musculoskeletal:   (+)     fracture, Fracture location: LUE,         GI/Hepatic:  - neg GI/hepatic ROS     Renal/Genitourinary:  - neg Renal ROS     Endo:  - neg endo ROS     Psychiatric/Substance Use:  - neg psychiatric ROS     Infectious Disease:  - neg infectious disease ROS     Malignancy:       Other:            Physical Exam    Airway        Mallampati: II   TM distance: > 3 FB   Neck ROM: full   Mouth opening: > 3 cm    Respiratory Devices and Support         Dental       (+) Modest Abnormalities - crowns, retainers, 1 or 2 missing teeth      Cardiovascular          Rhythm and rate: regular and normal     Pulmonary           breath sounds clear to auscultation           OUTSIDE LABS:  CBC:   Lab Results   Component Value Date    WBC 15.6 (H) 2024    HGB 12.6 2024    HCT 38.3 2024     2024     BMP:   Lab Results   Component Value Date     2024    POTASSIUM 3.6 2024    CHLORIDE 102 2024    CO2 26 2024    BUN 10.6 2024    CR 0.56 2024     (H) 2024     COAGS: No results found for: \"PTT\", \"INR\", \"FIBR\"  POC: No results found for: \"BGM\", \"HCG\", \"HCGS\"  HEPATIC: No results found " "for: \"ALBUMIN\", \"PROTTOTAL\", \"ALT\", \"AST\", \"GGT\", \"ALKPHOS\", \"BILITOTAL\", \"BILIDIRECT\", \"CAROLINE\"  OTHER:   Lab Results   Component Value Date    NORA 9.1 02/17/2024       Anesthesia Plan    ASA Status:  2    NPO Status:  NPO Appropriate    Anesthesia Type: General.     - Airway: LMA   Induction: Intravenous.   Maintenance: Balanced.   Techniques and Equipment:       - Drips/Meds: Dexmed. infusion     Consents    Anesthesia Plan(s) and associated risks, benefits, and realistic alternatives discussed. Questions answered and patient/representative(s) expressed understanding.     - Discussed:     - Discussed with:  Patient,       - Extended Intubation/Ventilatory Support Discussed: No.      - Patient is DNR/DNI Status: No     Use of blood products discussed: No .     Postoperative Care    Pain management: IV analgesics, Oral pain medications, Multi-modal analgesia.     - Plan for long acting post-op opioid use   PONV prophylaxis: Dexamethasone or Solumedrol, Ondansetron (or other 5HT-3)     Comments:    Other Comments:   Methadone 10 mg  Decadron 8 mg  Zofran 4 mg  Toradol 15 mg  Precedex  Sugammadex if paralytic to be used            Chu Bravo MD                  # Overweight: Estimated body mass index is 27.32 kg/m  as calculated from the following:    Height as of 2/17/24: 1.651 m (5' 5\").    Weight as of this encounter: 74.5 kg (164 lb 3.2 oz).      "

## 2024-02-19 NOTE — ANESTHESIA PROCEDURE NOTES
Airway       Patient location during procedure: OR  Staff -        CRNA: Markos Toro APRN CRNA       Performed By: CRNA  Consent for Airway        Urgency: elective  Indications and Patient Condition       Indications for airway management: eduardo-procedural       Induction type:intravenous       Mask difficulty assessment: 1 - vent by mask    Final Airway Details       Final airway type: supraglottic airway    Supraglottic Airway Details        Type: LMA       Brand: I-Gel       LMA size: 4    Post intubation assessment        Placement verified by: capnometry, equal breath sounds and chest rise        Number of attempts at approach: 1       Secured with: commercial tube blanton       Ease of procedure: easy       Dentition: Intact and Unchanged

## 2024-02-19 NOTE — ANESTHESIA POSTPROCEDURE EVALUATION
Patient: Kaitlyn Mahmood    Procedure: Procedure(s):  OPEN REDUCTION INTERNAL FIXATION, FRACTURE, WRIST, LEFT       Anesthesia Type:  General    Note:  Disposition: Outpatient   Postop Pain Control: Uneventful            Sign Out: Well controlled pain   PONV: No   Neuro/Psych: Uneventful            Sign Out: Acceptable/Baseline neuro status   Airway/Respiratory: Uneventful            Sign Out: Acceptable/Baseline resp. status   CV/Hemodynamics: Uneventful            Sign Out: Acceptable CV status   Other NRE: NONE   DID A NON-ROUTINE EVENT OCCUR? No           Last vitals:  Vitals Value Taken Time   /79 02/19/24 1000   Temp 96.9  F (36.1  C) 02/19/24 1000   Pulse 59 02/19/24 1007   Resp 9 02/19/24 1007   SpO2 98 % 02/19/24 1010   Vitals shown include unfiled device data.    Electronically Signed By: Chu Bravo MD  February 19, 2024  11:49 AM

## (undated) DEVICE — DRSG ADAPTIC 3X8" 6113

## (undated) DEVICE — CAST PLASTER SPLINT 3X15" EXTRA FAST

## (undated) DEVICE — BRUSH SURGICAL SCRUB W/4% CHG SOL 25ML 371073

## (undated) DEVICE — LINEN TOWEL PACK X5 5464

## (undated) DEVICE — DRAPE C-ARM MINI 5423

## (undated) DEVICE — WIRE GUIDE 0.054X6" ACUTRAK SGL TROCAR TIP SS WS-1406ST

## (undated) DEVICE — NDL 22GA 1.5"

## (undated) DEVICE — Device

## (undated) DEVICE — GLOVE BIOGEL PI MICRO INDICATOR UNDERGLOVE SZ 7.5 48975

## (undated) DEVICE — LINEN HALF SHEET 5512

## (undated) DEVICE — DRILL BIT ACUMED QUICK COUPLER 2.0MM 80-0318

## (undated) DEVICE — SU VICRYL 2-0 CT-2 27" J333H

## (undated) DEVICE — LINEN FULL SHEET 5511

## (undated) DEVICE — SU MONOCRYL 3-0 PS-2 27" Y427H

## (undated) DEVICE — SU ETHILON 4-0 PS-2 18" BLACK 1667H

## (undated) DEVICE — BNDG ELASTIC 3"X5YDS UNSTERILE 6611-30

## (undated) DEVICE — GLOVE BIOGEL PI MICRO INDICATOR UNDERGLOVE SZ 6.5 48965

## (undated) DEVICE — GLOVE BIOGEL PI MICRO SZ 6.0 48560

## (undated) DEVICE — GLOVE BIOGEL PI SZ 7.5 40875

## (undated) DEVICE — TOURNIQUET SGL BLADDER 18"X4" RED 5921-218-135

## (undated) DEVICE — BAG CLEAR TRASH 1.3M 39X33" P4040C

## (undated) DEVICE — PACK HAND SOP32HARMO

## (undated) DEVICE — TUBING SUCTION 12"X1/4" N612

## (undated) DEVICE — PREP CHLORAPREP 26ML TINTED HI-LITE ORANGE 930815

## (undated) DEVICE — SUCTION CANISTER MEDIVAC LINER 3000ML W/LID 65651-530

## (undated) DEVICE — CAST BUCKET

## (undated) RX ORDER — TRANEXAMIC ACID 10 MG/ML
INJECTION, SOLUTION INTRAVENOUS
Status: DISPENSED
Start: 2024-02-19

## (undated) RX ORDER — FENTANYL CITRATE 50 UG/ML
INJECTION, SOLUTION INTRAMUSCULAR; INTRAVENOUS
Status: DISPENSED
Start: 2024-02-19

## (undated) RX ORDER — METHOCARBAMOL 750 MG/1
TABLET, FILM COATED ORAL
Status: DISPENSED
Start: 2024-02-19

## (undated) RX ORDER — ACETAMINOPHEN 325 MG/1
TABLET ORAL
Status: DISPENSED
Start: 2024-02-19

## (undated) RX ORDER — METHADONE HYDROCHLORIDE 10 MG/ML
INJECTION, SOLUTION INTRAMUSCULAR; INTRAVENOUS; SUBCUTANEOUS
Status: DISPENSED
Start: 2024-02-19

## (undated) RX ORDER — FENTANYL CITRATE-0.9 % NACL/PF 10 MCG/ML
PLASTIC BAG, INJECTION (ML) INTRAVENOUS
Status: DISPENSED
Start: 2024-02-19

## (undated) RX ORDER — DEXMEDETOMIDINE HYDROCHLORIDE 4 UG/ML
INJECTION, SOLUTION INTRAVENOUS
Status: DISPENSED
Start: 2024-02-19

## (undated) RX ORDER — BUPIVACAINE HYDROCHLORIDE 2.5 MG/ML
INJECTION, SOLUTION EPIDURAL; INFILTRATION; INTRACAUDAL
Status: DISPENSED
Start: 2024-02-19

## (undated) RX ORDER — CEFAZOLIN SODIUM/WATER 2 G/20 ML
SYRINGE (ML) INTRAVENOUS
Status: DISPENSED
Start: 2024-02-19